# Patient Record
Sex: MALE | Race: WHITE | NOT HISPANIC OR LATINO | Employment: FULL TIME | URBAN - METROPOLITAN AREA
[De-identification: names, ages, dates, MRNs, and addresses within clinical notes are randomized per-mention and may not be internally consistent; named-entity substitution may affect disease eponyms.]

---

## 2024-04-02 ENCOUNTER — OFFICE VISIT (OUTPATIENT)
Dept: URGENT CARE | Facility: CLINIC | Age: 24
End: 2024-04-02
Payer: COMMERCIAL

## 2024-04-02 VITALS
WEIGHT: 190 LBS | DIASTOLIC BLOOD PRESSURE: 85 MMHG | BODY MASS INDEX: 27.2 KG/M2 | SYSTOLIC BLOOD PRESSURE: 137 MMHG | HEIGHT: 70 IN | RESPIRATION RATE: 18 BRPM | HEART RATE: 81 BPM | TEMPERATURE: 99 F | OXYGEN SATURATION: 96 %

## 2024-04-02 DIAGNOSIS — S62.346A CLOSED NONDISPLACED FRACTURE OF BASE OF FIFTH METACARPAL BONE OF RIGHT HAND, INITIAL ENCOUNTER: Primary | ICD-10-CM

## 2024-04-02 DIAGNOSIS — M79.641 RIGHT HAND PAIN: ICD-10-CM

## 2024-04-02 PROCEDURE — 29125 APPL SHORT ARM SPLINT STATIC: CPT | Mod: RT,S$GLB,, | Performed by: NURSE PRACTITIONER

## 2024-04-02 PROCEDURE — 99203 OFFICE O/P NEW LOW 30 MIN: CPT | Mod: 25,S$GLB,, | Performed by: NURSE PRACTITIONER

## 2024-04-02 NOTE — PATIENT INSTRUCTIONS
May alternate Tylenol and ibuprofen as directed for pain if you are not allergic.    Follow-up with Orthopedic specialist for further evaluation and treatment.

## 2024-04-02 NOTE — PROGRESS NOTES
"Subjective:      Patient ID: Jona Gan is a 23 y.o. male.    Vitals:  height is 5' 10" (1.778 m) and weight is 86.2 kg (190 lb). His oral temperature is 98.7 °F (37.1 °C). His blood pressure is 137/85 and his pulse is 81. His respiration is 18 and oxygen saturation is 96%.     Chief Complaint: Hand Injury (/)    This is a 23 y.o. male who presents today with a chief complaint of right hand injury. Pt reports that he hit a tree with a closed fist three days ago and is still experiencing swelling and pain to right lateral hand.     Home Treatment:  Ibuprofen, icing        Hand Injury   His dominant hand is their right hand. The incident occurred 2 days ago. The incident occurred at home. The injury mechanism was a direct blow. The pain is present in the right hand. The pain is at a severity of 7/10. The pain has been Fluctuating since the incident. The symptoms are aggravated by lifting. He has tried acetaminophen and ice for the symptoms. The treatment provided mild relief.     Constitution: Negative.   Musculoskeletal:  Positive for pain and joint swelling.   Skin:  Positive for abrasion.   Neurological:  Negative for disorientation and altered mental status.   Psychiatric/Behavioral:  Negative for altered mental status, disorientation and confusion.       Objective:     Physical Exam   Constitutional: He is oriented to person, place, and time. He appears well-developed. He is cooperative.   HENT:   Head: Normocephalic and atraumatic.   Ears:   Right Ear: Hearing, tympanic membrane, external ear and ear canal normal.   Left Ear: Hearing, tympanic membrane, external ear and ear canal normal.   Nose: Nose normal. No mucosal edema or nasal deformity. No epistaxis. Right sinus exhibits no maxillary sinus tenderness and no frontal sinus tenderness. Left sinus exhibits no maxillary sinus tenderness and no frontal sinus tenderness.   Mouth/Throat: Uvula is midline, oropharynx is clear and moist and mucous " membranes are normal. No trismus in the jaw. Normal dentition. No uvula swelling.   Eyes: Conjunctivae and lids are normal.   Neck: Trachea normal and phonation normal. Neck supple.   Cardiovascular: Normal rate, regular rhythm, normal heart sounds and normal pulses.   Pulmonary/Chest: Effort normal and breath sounds normal.   Abdominal: Normal appearance and bowel sounds are normal. Soft.   Musculoskeletal: Normal range of motion.         General: Normal range of motion.      Right hand: He exhibits tenderness (dorsal ulnar aspect) and swelling (ulnar side dorsal). He exhibits normal range of motion and no laceration.   Neurological: He is alert and oriented to person, place, and time. He exhibits normal muscle tone.   Skin: Skin is warm, dry and intact.   Psychiatric: His speech is normal and behavior is normal. Judgment and thought content normal.   Nursing note and vitals reviewed.    X-Ray Hand 3 View Right  EXAMINATION:  XR HAND COMPLETE 3 VIEW RIGHT    CLINICAL HISTORY:  Pain in right hand    TECHNIQUE:  PA, lateral, and oblique views of the right hand were performed.    COMPARISON:  None    FINDINGS:  There is a comminuted mildly displaced fracture involving the proximal head of the 5th metacarpal with mild palmar angulation.  Fracture lucency extends to the 5th MCP joint.  Mild adjacent soft tissue swelling.  Remaining bones intact.    This report was flagged in [employer] as abnormal.    Electronically signed by: Theo Tsai  Date:    04/02/2024  Time:    09:15     Assessment:     1. Closed nondisplaced fracture of base of fifth metacarpal bone of right hand, initial encounter    2. Right hand pain        Plan:       Closed nondisplaced fracture of base of fifth metacarpal bone of right hand, initial encounter  -     Ambulatory referral/consult to Orthopedics    Right hand pain  -     X-Ray Hand 3 View Right; Future; Expected date: 04/02/2024    Placed pt in short arm ortho glass splint to left wrist for  immobilization until follow up with ortho.      Patient Instructions   May alternate Tylenol and ibuprofen as directed for pain if you are not allergic.    Follow-up with Orthopedic specialist for further evaluation and treatment.           Filippo Peraza, ANCAC